# Patient Record
Sex: MALE | Race: WHITE
[De-identification: names, ages, dates, MRNs, and addresses within clinical notes are randomized per-mention and may not be internally consistent; named-entity substitution may affect disease eponyms.]

---

## 2018-04-20 ENCOUNTER — HOSPITAL ENCOUNTER (OUTPATIENT)
Dept: HOSPITAL 75 - RAD | Age: 23
End: 2018-04-20
Attending: NURSE PRACTITIONER
Payer: COMMERCIAL

## 2018-04-20 DIAGNOSIS — M47.817: ICD-10-CM

## 2018-04-20 DIAGNOSIS — M43.17: ICD-10-CM

## 2018-04-20 DIAGNOSIS — M51.27: ICD-10-CM

## 2018-04-20 DIAGNOSIS — M48.07: Primary | ICD-10-CM

## 2018-04-20 DIAGNOSIS — M99.53: ICD-10-CM

## 2018-04-20 PROCEDURE — 72148 MRI LUMBAR SPINE W/O DYE: CPT

## 2018-04-20 NOTE — DIAGNOSTIC IMAGING REPORT
PROCEDURE: MRI lumbar spine.



TECHNIQUE: Multiplanar, multisequence MRI of the lumbar spine was

performed without contrast.



INDICATION: Low back pain with right leg pain with numbness and

tingling.



COMPARISON: No prior studies are available for comparison.



FINDINGS: Curvature of the lumbar spine is normal. There is mild

retrolisthesis of L5 on S1. The vertebral body heights are

maintained. No acute compression fracture is seen. No marrow

lesion is identified. There is disc desiccation noted at the

L4-L5 and L5-S1 levels, compatible with degenerative disc

disease. The conus is unremarkable at the L1 level.



T12-L1: No central canal or neuroforaminal stenosis is detected.



L1-L2: Unremarkable.



L2-L3: Unremarkable.



L3-L4: Unremarkable.



L4-L5: There is broad-based disc bulging indenting the ventral

thecal sac, with associated wide-based midline disc bulge. The

central canal is narrowed to approximately 6 mm in AP diameter.

There is narrowing of bilateral lateral recesses. No significant

neuroforaminal narrowing is detected. There appear to be

degenerative changes of the facets bilaterally.



L5-S1: There is a large broad-based disc bulge at this level with

secondary focal right paramidline disc bulge though wide-based

disc bulging does create moderate central canal narrowing. There

is also bilateral lateral recess and bilateral neuroforaminal

stenosis. Secondary right paramidline disc bulge likely creates

even more significant lateral recess stenosis with likely

impingement upon main right S1 nerve root origin.



Paraspinous tissues are unremarkable.



IMPRESSION: L4-L5 and L5-S1 degenerative changes and disc

bulging, as described resulting in central canal, lateral recess,

and neuroforaminal stenosis described level by level above.



Dictated by: 



  Dictated on workstation # [id number]